# Patient Record
Sex: FEMALE | Race: WHITE | NOT HISPANIC OR LATINO | Employment: FULL TIME | URBAN - METROPOLITAN AREA
[De-identification: names, ages, dates, MRNs, and addresses within clinical notes are randomized per-mention and may not be internally consistent; named-entity substitution may affect disease eponyms.]

---

## 2018-04-04 ENCOUNTER — TRANSCRIBE ORDERS (OUTPATIENT)
Dept: ADMINISTRATIVE | Facility: HOSPITAL | Age: 43
End: 2018-04-04

## 2018-04-04 DIAGNOSIS — R79.89 ELEVATED PROLACTIN LEVEL: Primary | ICD-10-CM

## 2018-04-18 ENCOUNTER — HOSPITAL ENCOUNTER (OUTPATIENT)
Dept: MRI IMAGING | Facility: HOSPITAL | Age: 43
Discharge: HOME | End: 2018-04-18
Admitting: FAMILY MEDICINE

## 2018-04-18 DIAGNOSIS — R79.89 ELEVATED PROLACTIN LEVEL: ICD-10-CM

## 2018-04-18 PROCEDURE — A9577 INJ MULTIHANCE: HCPCS | Performed by: FAMILY MEDICINE

## 2018-04-18 PROCEDURE — 0 GADOBENATE DIMEGLUMINE 529 MG/ML SOLUTION: Performed by: FAMILY MEDICINE

## 2018-04-18 PROCEDURE — 70553 MRI BRAIN STEM W/O & W/DYE: CPT

## 2018-04-18 RX ADMIN — GADOBENATE DIMEGLUMINE 17 ML: 529 INJECTION, SOLUTION INTRAVENOUS at 18:29

## 2018-05-16 ENCOUNTER — APPOINTMENT (OUTPATIENT)
Dept: WOMENS IMAGING | Facility: HOSPITAL | Age: 43
End: 2018-05-16

## 2018-05-16 PROCEDURE — 77067 SCR MAMMO BI INCL CAD: CPT | Performed by: RADIOLOGY

## 2018-05-16 PROCEDURE — 77063 BREAST TOMOSYNTHESIS BI: CPT | Performed by: RADIOLOGY

## 2018-05-17 ENCOUNTER — APPOINTMENT (OUTPATIENT)
Dept: WOMENS IMAGING | Facility: HOSPITAL | Age: 43
End: 2018-05-17

## 2018-05-31 RX ORDER — DULOXETIN HYDROCHLORIDE 60 MG/1
60 CAPSULE, DELAYED RELEASE ORAL DAILY
COMMUNITY
End: 2018-06-04 | Stop reason: ALTCHOICE

## 2018-05-31 RX ORDER — BUSPIRONE HYDROCHLORIDE 10 MG/1
10 TABLET ORAL DAILY
COMMUNITY

## 2018-06-04 ENCOUNTER — OFFICE VISIT (OUTPATIENT)
Dept: NEUROSURGERY | Facility: CLINIC | Age: 43
End: 2018-06-04

## 2018-06-04 ENCOUNTER — TELEPHONE (OUTPATIENT)
Dept: NEUROSURGERY | Facility: CLINIC | Age: 43
End: 2018-06-04

## 2018-06-04 VITALS
RESPIRATION RATE: 18 BRPM | HEIGHT: 61 IN | WEIGHT: 187 LBS | BODY MASS INDEX: 35.3 KG/M2 | DIASTOLIC BLOOD PRESSURE: 62 MMHG | SYSTOLIC BLOOD PRESSURE: 110 MMHG | HEART RATE: 90 BPM

## 2018-06-04 DIAGNOSIS — H53.8 BLURRED VISION, BILATERAL: ICD-10-CM

## 2018-06-04 DIAGNOSIS — E23.7 PITUITARY LESION (HCC): Primary | ICD-10-CM

## 2018-06-04 DIAGNOSIS — H57.02 UNEQUAL PUPILS: ICD-10-CM

## 2018-06-04 DIAGNOSIS — N64.3 GALACTORRHEA: ICD-10-CM

## 2018-06-04 PROCEDURE — 99244 OFF/OP CNSLTJ NEW/EST MOD 40: CPT | Performed by: NURSE PRACTITIONER

## 2018-06-04 RX ORDER — CITALOPRAM 40 MG/1
40 TABLET ORAL DAILY
COMMUNITY

## 2018-06-04 NOTE — TELEPHONE ENCOUNTER
----- Message from ODILON Whatley sent at 6/4/2018  9:38 AM EDT -----  Regarding: Optometry  Please call and request visual field test results for patient from [facility].  This would've been in December 2017.

## 2018-06-04 NOTE — PROGRESS NOTES
Subjective   Patient ID: Lexii Ornelas is a 42 y.o. female is being seen for consultation today at the request of Gila Lee for pituitary tumor. Patient presents unaccompanied.     History of Present Illness  Patient presents for evaluation and treatment recommendations of sellar mass. She reports galactorrhea bilaterally that did not cease after cessation of breast feeding 2+ years ago. It is not spontaneous but expressible. No blood or breast pain present. Recent mammogram normal. Normal menstrual cycles. She has noticed progressive worsening of distance vision over last 1 year but particularly worse in the last 6 months. It is in the context of blurred vision. No family history of brain tumors. She did have 2 miscarriages prior to her first preganancy- no fertility agents. No chronic headaches. No significant changes to shoe or ring size.     The following portions of the patient's history were reviewed and updated as appropriate: allergies, current medications, past family history, past medical history, past social history, past surgical history and problem list.    Review of Systems   Constitutional: Negative for chills, fatigue and fever.   HENT: Negative for hearing loss and tinnitus.    Eyes: Positive for visual disturbance.   Respiratory: Positive for shortness of breath (due to respiratory infection). Negative for cough and wheezing.    Cardiovascular: Negative for chest pain and palpitations.   Gastrointestinal: Negative for abdominal pain, nausea and vomiting.   Endocrine:        Galactorrhea   Genitourinary: Negative for difficulty urinating and enuresis.   Musculoskeletal: Negative for gait problem.   Skin: Negative for rash and wound.   Neurological: Negative for dizziness, seizures, syncope, speech difficulty, weakness, light-headedness, numbness and headaches.   Psychiatric/Behavioral: Negative for confusion, decreased concentration and sleep disturbance.       Objective   Physical  Exam   Constitutional: She is oriented to person, place, and time. She appears well-developed and well-nourished.   Body mass index is 35.33 kg/m².     Eyes: EOM are normal. Pupils are unequal.   Neck: Neck supple. Normal carotid pulses present.   Cardiovascular: Normal rate, regular rhythm and intact distal pulses.    Pulmonary/Chest: Effort normal.   Neurological: She is alert and oriented to person, place, and time. She has normal strength. She has a normal Finger-Nose-Finger Test, a normal Romberg Test and a normal Tandem Gait Test. Gait normal.   Skin: Skin is warm and dry.   Psychiatric: She has a normal mood and affect. Her speech is normal and behavior is normal. Judgment normal.   Vitals reviewed.    Neurologic Exam     Mental Status   Oriented to person, place, and time.   Follows 3 step commands.   Attention: normal. Concentration: normal.   Speech: speech is normal   Level of consciousness: alert  Knowledge: good.   Normal comprehension.     Cranial Nerves     CN II   Visual fields full to confrontation.     CN III, IV, VI   Extraocular motions are normal.   Pupils: unequal  Right pupil: Size: 5 mm.   Left pupil: Size: 3 mm.   CN III: no CN III palsy  CN VI: no CN VI palsy  Nystagmus: none   Diplopia: none  Ophthalmoparesis: none  Conjugate gaze: present    CN V   Facial sensation intact.     CN VII   Facial expression full, symmetric.     CN VIII   CN VIII normal.     CN IX, X   CN IX normal.   CN X normal.     CN XI   CN XI normal.     CN XII   CN XII normal.     Motor Exam   Right arm pronator drift: absent  Left arm pronator drift: absent    Strength   Strength 5/5 throughout.     Sensory Exam   Light touch normal.     Gait, Coordination, and Reflexes     Gait  Gait: normal    Coordination   Romberg: negative  Finger to nose coordination: normal  Tandem walking coordination: normal      Assessment/Plan   Independent Review of Radiographic Studies:    MRI of the brain from [facility]  dated April 18, 2018 was reviewed with Dr. Loo.  This reveals an enhancing mass along the left posterior clinoid up to but not compressive of the optic chiasm.  The pituitary gland itself appears normal.    Medical Decision Making:    Patient presents with complaints of persistent galactorrhea and blurring of vision.  She did breast-feed, but stopped breast-feeding approximately 2 years ago and she continues to have expressible milk.  Her blurred vision has become a progressive issue in the last 6 months to 1 year.  She required no fertility agents for pregnancy but did have 2 miscarriages.  She has normal menstrual cycles.    MRI of the brain showed a sellar abnormality but not compressive of the optic chiasm.  Her exam is as noted above with no neurologic red flags.  Particularly her visual fields appear intact to confrontation.  She does have some pupillary inequality with right greater than left.  No sudden severe headaches.     We will plan to evaluate people inequality with MRA head now.  I will call her with results.  I will order MRI pituitary protocol study in 6 months to evaluate for changes.  We'll order pituitary panel of labs and referral to endocrinology to evaluate for a secretory tumor.  I will request her eye exam visits from [facility].  If she has not had visual field testing, we will order that as well.  I will contact her if that is needed.  Otherwise, she will get her labs next week or so and we will see her back in 6 months.  She knows to contact us if she has any progression of headaches.    Plan: Endocrine labs.  Endocrinology referral.  Request eye records.  MRA head now.  MRI pituitary in 6 months.    Lexii was seen today for brain tumor.    Diagnoses and all orders for this visit:    Pituitary lesion  -     Follicle stimulating hormone; Future  -     Luteinizing hormone; Future  -     ACTH; Future  -     Cortisol; Future  -     Prolactin; Future  -     Insulin-like growth  factor; Future  -     Growth hormone; Future  -     Thyroid Panel With TSH; Future  -     MRI Pituitary With & Without Contrast; Future  -     Ambulatory Referral to Endocrinology    Galactorrhea  -     Follicle stimulating hormone; Future  -     Luteinizing hormone; Future  -     ACTH; Future  -     Cortisol; Future  -     Prolactin; Future  -     Insulin-like growth factor; Future  -     Growth hormone; Future  -     Thyroid Panel With TSH; Future  -     MRI Pituitary With & Without Contrast; Future  -     Ambulatory Referral to Endocrinology    Blurred vision, bilateral  -     MRI Angiogram Head Without Contrast    Unequal pupils  -     MRI Angiogram Head Without Contrast      Return in about 6 months (around 12/4/2018) for Call patient with plan, Follow-up with Dr. Loo, After testing, with imaging.

## 2018-06-04 NOTE — TELEPHONE ENCOUNTER
[facility]: [phone] opens at 10:00am    Spoke with Ariana at [facility], states that patient was only seen August 2017 and no VFT was performed.     Attempted to call patient to confirm patient was seen at [facility] location in [place] and if she was seen after her appt in August at that office or any other office.  LVM for her to call office back.

## 2018-06-05 NOTE — TELEPHONE ENCOUNTER
She needs ophthalmology eval with VFT please. I ordered referral. Dr. Warner unless she has someone in particular that she wants to see.

## 2018-06-05 NOTE — TELEPHONE ENCOUNTER
Patient called back, states that if VFT was not done at visit with Dr. Chávez in 2017, she did not have them done.

## 2018-06-14 ENCOUNTER — TELEPHONE (OUTPATIENT)
Dept: NEUROSURGERY | Facility: CLINIC | Age: 43
End: 2018-06-14

## 2018-06-14 NOTE — TELEPHONE ENCOUNTER
----- Message from Franny Moses sent at 6/14/2018  1:46 PM EDT -----  Regarding: RE: Endo referral  Pt just called and said that she is getting her labs done tomorrow and I gave her scheduling number and she will call and get her MRA scheduled   She is suppose to let us know when it is scheduled  ----- Message -----  From: Barbara A Sturgeon, MA  Sent: 6/14/2018  10:26 AM  To: Franny Moses  Subject: Endo referral                                    Do you know where we are on this referral to endo?  She has outstanding endo labs that are showing up on my incomplete task list.

## 2018-06-15 ENCOUNTER — HOSPITAL ENCOUNTER (OUTPATIENT)
Dept: MRI IMAGING | Facility: HOSPITAL | Age: 43
Discharge: HOME | End: 2018-06-15
Admitting: NURSE PRACTITIONER

## 2018-06-15 ENCOUNTER — LAB (OUTPATIENT)
Dept: LAB | Facility: HOSPITAL | Age: 43
End: 2018-06-15

## 2018-06-15 DIAGNOSIS — N64.3 GALACTORRHEA: ICD-10-CM

## 2018-06-15 DIAGNOSIS — E23.7 PITUITARY LESION (HCC): ICD-10-CM

## 2018-06-15 LAB
CORTIS SERPL-MCNC: 6.37 MCG/DL
FSH SERPL-ACNC: 1.68 MIU/ML
LH SERPL-ACNC: 2.35 MIU/ML
PROLACTIN SERPL-MCNC: 15.7 NG/ML (ref 4.79–23.3)
T-UPTAKE NFR SERPL: 1.11 TBI (ref 0.8–1.3)
T4 SERPL-MCNC: 6.55 MCG/DL (ref 4.5–11.7)
TSH SERPL DL<=0.05 MIU/L-ACNC: 1.91 MIU/ML (ref 0.27–4.2)

## 2018-06-15 PROCEDURE — 83002 ASSAY OF GONADOTROPIN (LH): CPT

## 2018-06-15 PROCEDURE — 84436 ASSAY OF TOTAL THYROXINE: CPT

## 2018-06-15 PROCEDURE — 84146 ASSAY OF PROLACTIN: CPT

## 2018-06-15 PROCEDURE — 83001 ASSAY OF GONADOTROPIN (FSH): CPT

## 2018-06-15 PROCEDURE — 82533 TOTAL CORTISOL: CPT

## 2018-06-15 PROCEDURE — 83003 ASSAY GROWTH HORMONE (HGH): CPT

## 2018-06-15 PROCEDURE — 84443 ASSAY THYROID STIM HORMONE: CPT

## 2018-06-15 PROCEDURE — 84305 ASSAY OF SOMATOMEDIN: CPT

## 2018-06-15 PROCEDURE — 36415 COLL VENOUS BLD VENIPUNCTURE: CPT

## 2018-06-15 PROCEDURE — 82024 ASSAY OF ACTH: CPT

## 2018-06-15 PROCEDURE — 70544 MR ANGIOGRAPHY HEAD W/O DYE: CPT

## 2018-06-15 PROCEDURE — 84479 ASSAY OF THYROID (T3 OR T4): CPT

## 2018-06-18 LAB
ACTH PLAS-MCNC: 11.8 PG/ML (ref 7.2–63.3)
GH SERPL-MCNC: 0.1 NG/ML (ref 0–10)
IGF-I SERPL-MCNC: 151 NG/ML (ref 62–204)

## 2018-06-22 ENCOUNTER — TELEPHONE (OUTPATIENT)
Dept: NEUROSURGERY | Facility: CLINIC | Age: 43
End: 2018-06-22

## 2018-06-22 DIAGNOSIS — H57.02 UNEQUAL PUPILS: Primary | ICD-10-CM

## 2018-06-22 DIAGNOSIS — R90.89 ABNORMAL BRAIN MRI: ICD-10-CM

## 2018-06-22 NOTE — TELEPHONE ENCOUNTER
----- Message from ODILON Whatley sent at 6/4/2018 10:11 AM EDT -----  Regarding: MRA head and VFT results  Unequal pupils; VFT results from [facility]

## 2018-06-22 NOTE — TELEPHONE ENCOUNTER
Please notify patient that I reviewed MRA head with Dr. Loo.  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  This is likely a variation of normal vasculature, but we will repeat it when we repeat her MRI in approximately 6 months.  If there is no change at that time, we will not need to do anything further.  It is certainly not the cause of her unequal pupils.  She should keep the appointment with ophthalmology in early August for evaluation of visual fields as well.

## 2018-06-27 NOTE — TELEPHONE ENCOUNTER
LVM #2 asking patient to call us. If I am not available, please provide her with the information below.

## 2018-08-10 ENCOUNTER — OFFICE VISIT (OUTPATIENT)
Dept: ENDOCRINOLOGY | Age: 43
End: 2018-08-10

## 2018-08-10 VITALS
OXYGEN SATURATION: 97 % | DIASTOLIC BLOOD PRESSURE: 62 MMHG | HEIGHT: 61 IN | BODY MASS INDEX: 35.12 KG/M2 | HEART RATE: 90 BPM | WEIGHT: 186 LBS | SYSTOLIC BLOOD PRESSURE: 116 MMHG

## 2018-08-10 DIAGNOSIS — E23.7 PITUITARY LESION (HCC): Primary | ICD-10-CM

## 2018-08-10 DIAGNOSIS — N64.3 GALACTORRHEA: ICD-10-CM

## 2018-08-10 PROCEDURE — 99204 OFFICE O/P NEW MOD 45 MIN: CPT | Performed by: INTERNAL MEDICINE

## 2018-08-10 NOTE — PROGRESS NOTES
Chief Complaint   Patient presents with   • Pituitary Problem   NEW PATIENT APPOINTMENT/ PITUITARY LESION/ GALACTORRHEA     HPI  Lexii Ornelas,42 y.o. WF is here as new pt for the evaluation of pituitary microadenoma. Consulted by Dr. Loo.   Pt mentioned to her primary care that she has been having breast discharge for the last 2 1/2 years. She has her son in March 2016 - stopped breast  Feeding in July 2016.   She just reports that milk is staining her bra on both sides. No breast tenderness, no enlargement of breasts, no family hx of breast cancer, no double vision, no temporal loss of vision, no frequent head ache.   She even saw Dr. Calixto who saw her Pituitary MRI   She even saw ophthalmologist who didn't feel that her pupil size changes were related to the tumor. Has another appointment with her opthal in 1 month.   Has been on buspirone and celexa for about 20 years for depression and no recent change in meds.  No recreational drug usage.     Menarche at age 12, she struggled with getting pregnant, had 2 miscarriages. She has 2 kids of her own, youngest is 2 years old.   She didn't need any fertility treatment to get pregnant.   No other known hormonal problems     Reviewed primary care physician's/consulting physician documentation and lab results :     I have reviewed the patient's allergies, medicines, past medical hx, family hx and social hx in detail.    Past Medical History:   Diagnosis Date   • Abnormal Pap smear of cervix     LEEP    • Anxiety    • Asthma    • Basal cell carcinoma     basal cell carcinoma   • Broken bones    • Depression    • Migraine        Family History   Problem Relation Age of Onset   • No Known Problems Mother    • No Known Problems Father    • Stroke Maternal Grandmother        Social History     Social History   • Marital status:      Spouse name: N/A   • Number of children: N/A   • Years of education: N/A     Occupational History   • management      full time  "    Social History Main Topics   • Smoking status: Never Smoker   • Smokeless tobacco: Never Used   • Alcohol use Yes      Comment: social drinking   • Drug use: No   • Sexual activity: Yes     Partners: Male     Other Topics Concern   • Not on file     Social History Narrative   • No narrative on file       Allergies   Allergen Reactions   • Benadryl [Diphenhydramine] Other (See Comments)     Wakefullness   • Silicone Rash         Current Outpatient Prescriptions:   •  busPIRone (BUSPAR) 10 MG tablet, Take 10 mg by mouth Daily., Disp: , Rfl:   •  citalopram (CeleXA) 40 MG tablet, Take 40 mg by mouth Daily., Disp: , Rfl:   •  Multiple Vitamins-Minerals (CENTRUM ADULTS PO), Take  by mouth Daily., Disp: , Rfl:      Review of Systems   Constitutional: Negative for appetite change, fatigue and fever.   Eyes: Positive for visual disturbance.   Respiratory: Negative for shortness of breath.    Cardiovascular: Negative for palpitations and leg swelling.   Gastrointestinal: Negative for abdominal pain and vomiting.   Endocrine: Negative for polydipsia and polyuria.   Musculoskeletal: Negative for joint swelling and neck pain.   Skin: Negative for rash.   Neurological: Negative for weakness and numbness.   Psychiatric/Behavioral: Negative for behavioral problems.       Objective:    /62   Pulse 90   Ht 154.9 cm (61\")   Wt 84.4 kg (186 lb)   SpO2 97%   BMI 35.14 kg/m²     Physical Exam   Constitutional: She is oriented to person, place, and time. She appears well-nourished.   HENT:   Head: Normocephalic and atraumatic.   Eyes: Conjunctivae and EOM are normal. No scleral icterus.   anisocoria   Neck: Normal range of motion. Neck supple. No thyromegaly present.   Cardiovascular: Normal rate and normal heart sounds.  Exam reveals no friction rub.    No murmur heard.  Pulmonary/Chest: Effort normal and breath sounds normal. No stridor. She has no wheezes. She has no rales.   Abdominal: Soft. Bowel sounds are normal. " She exhibits no distension. There is no tenderness.   Musculoskeletal: She exhibits no edema or tenderness.   Lymphadenopathy:     She has no cervical adenopathy.   Neurological: She is alert and oriented to person, place, and time.   Skin: Skin is warm and dry. She is not diaphoretic.   Psychiatric: She has a normal mood and affect.   Vitals reviewed.      Results Review:    I reviewed the patient's new clinical results.    Lab on 06/15/2018   Component Date Value Ref Range Status   • FSH 06/15/2018 1.68  mIU/mL Final   • LH 06/15/2018 2.35  mIU/mL Final   • ACTH 06/15/2018 11.8  7.2 - 63.3 pg/mL Final    ACTH reference interval for samples collected between 7 and 10 AM.   • Cortisol 06/15/2018 6.37    mcg/dL Final   • Prolactin 06/15/2018 15.70  4.79 - 23.30 ng/mL Final   • Insulin-Like Growth Factor-1 06/15/2018 151  62 - 204 ng/mL Final   • Growth Hormone 06/15/2018 0.1  0.0 - 10.0 ng/mL Final   • TSH 06/15/2018 1.910  0.270 - 4.200 mIU/mL Final   • T Uptake 06/15/2018 1.11  0.80 - 1.30 TBI Final   • T4, Total 06/15/2018 6.55  4.50 - 11.70 mcg/dL Final       Lexii was seen today for pituitary problem.    Diagnoses and all orders for this visit:    Pituitary lesion (CMS/HCC)  -     ACTH  -     Comprehensive Metabolic Panel  -     Cortisol - AM  -     Macroprolactin  -     Prolactin  -     T4, Free  -     TSH  -     Basic Metabolic Panel; Future  -     Hemoglobin A1c; Future  -     Lipid Panel; Future  -     TSH; Future  -     Vitamin B12 & Folate; Future  -     Vitamin D 25 Hydroxy; Future  -     Prolactin; Future  -     Macroprolactin; Future    Galactorrhea  -     ACTH  -     Comprehensive Metabolic Panel  -     Cortisol - AM  -     Macroprolactin  -     Prolactin  -     T4, Free  -     TSH  -     Basic Metabolic Panel; Future  -     Hemoglobin A1c; Future  -     Lipid Panel; Future  -     TSH; Future  -     Vitamin B12 & Folate; Future  -     Vitamin D 25 Hydroxy; Future  -     Prolactin; Future  -      "Macroprolactin; Future    Pituitary microadenoma  Patient is scheduled for a repeat MRI of the pituitary in October 2018.  Discussed with the patient the warning signs of headache, temporal loss of vision, at which time that she needs to go to the emergency department.    Galactorrhea - worse  Noted that patient's prolactin levels are within normal limits.  Will check macro prolactin level, if the level was abnormal with start the patient on cabergoline.  However if that macro prolactin is within normal limits discussed with the patient to refrain from breasts stimulation and to see if her breast discharge would remit on its own.  Noted that to the rest of the HPA axis is within normal limits.    Also discussed with the patient about stopping her citalopram and buspirone as they could be resulting in the galactorrhea as well but patient's depression is so worse that she is concerned that she could get suicidal if she stops the medication or changes herself to any atypical antipsychotics as she has done that in the past with no success.      Thank you for asking me to see your patient Lexii Ornelas in consultation.        Yoli Martin MD  08/10/18    EMR Dragon / transcription disclaimer:     \"Dictated utilizing Dragon dictation\".         "

## 2018-08-17 LAB
ACTH PLAS-MCNC: 11.3 PG/ML (ref 7.2–63.3)
ALBUMIN SERPL-MCNC: 4.2 G/DL (ref 3.5–5.2)
ALBUMIN/GLOB SERPL: 1.3 G/DL
ALP SERPL-CCNC: 48 U/L (ref 39–117)
ALT SERPL-CCNC: 12 U/L (ref 1–33)
AST SERPL-CCNC: 11 U/L (ref 1–32)
BILIRUB SERPL-MCNC: 0.2 MG/DL (ref 0.1–1.2)
BUN SERPL-MCNC: 10 MG/DL (ref 6–20)
BUN/CREAT SERPL: 13.2 (ref 7–25)
CALCIUM SERPL-MCNC: 9.3 MG/DL (ref 8.6–10.5)
CHLORIDE SERPL-SCNC: 104 MMOL/L (ref 98–107)
CO2 SERPL-SCNC: 23.1 MMOL/L (ref 22–29)
CORTIS AM PEAK SERPL-MCNC: 5.2 UG/DL (ref 6.2–19.4)
CREAT SERPL-MCNC: 0.76 MG/DL (ref 0.57–1)
GLOBULIN SER CALC-MCNC: 3.2 GM/DL
GLUCOSE SERPL-MCNC: 95 MG/DL (ref 65–99)
MACROPROLACTIN/PROLACTIN SFR SERPL: 0 %
POTASSIUM SERPL-SCNC: 4.4 MMOL/L (ref 3.5–5.2)
PROLACTIN SERPL-MCNC: 12 NG/ML
PROLACTIN SERPL-MCNC: 13.2 NG/ML (ref 4.8–23.3)
PROLACTIN.MONOMERIC SERPL-MCNC: 12 NG/ML
PROT SERPL-MCNC: 7.4 G/DL (ref 6–8.5)
SODIUM SERPL-SCNC: 138 MMOL/L (ref 136–145)
T4 FREE SERPL-MCNC: 1.13 NG/DL (ref 0.93–1.7)
TSH SERPL DL<=0.005 MIU/L-ACNC: 1.7 MIU/ML (ref 0.27–4.2)

## 2018-09-28 ENCOUNTER — TELEPHONE (OUTPATIENT)
Dept: NEUROSURGERY | Facility: CLINIC | Age: 43
End: 2018-09-28

## 2018-10-02 NOTE — TELEPHONE ENCOUNTER
Patient called and would like to know if she should move her appt with Dr Loo and her MRI out. She had scheduled her MRI for Oct because she had her last one done in April and Oct would have been 6 months.    We sent the patient to see Dr Warner and she was not able to help her and has sent her to a doctor at [facility]. Pt sees that physician on 10/24. Should she wait til after she sees that MD to see Rosario      Please advise

## 2018-10-03 NOTE — TELEPHONE ENCOUNTER
Yes, need to move appt to after results from neuro-ophth available. Moved to [facility] Friday 11-9 at 9:15. Left detailed VM with this information and mailed letter to patient with this information.

## 2018-10-05 ENCOUNTER — APPOINTMENT (OUTPATIENT)
Dept: MRI IMAGING | Facility: HOSPITAL | Age: 43
End: 2018-10-05

## 2018-11-01 ENCOUNTER — HOSPITAL ENCOUNTER (OUTPATIENT)
Dept: MRI IMAGING | Facility: HOSPITAL | Age: 43
Discharge: HOME | End: 2018-11-01
Admitting: NURSE PRACTITIONER

## 2018-11-01 ENCOUNTER — HOSPITAL ENCOUNTER (OUTPATIENT)
Dept: MRI IMAGING | Facility: HOSPITAL | Age: 43
Discharge: HOME | End: 2018-11-01

## 2018-11-01 DIAGNOSIS — R90.89 ABNORMAL BRAIN MRI: ICD-10-CM

## 2018-11-01 DIAGNOSIS — E23.7 PITUITARY LESION (HCC): ICD-10-CM

## 2018-11-01 DIAGNOSIS — H57.02 UNEQUAL PUPILS: ICD-10-CM

## 2018-11-01 DIAGNOSIS — N64.3 GALACTORRHEA: ICD-10-CM

## 2018-11-01 PROCEDURE — 0 GADOBENATE DIMEGLUMINE 529 MG/ML SOLUTION: Performed by: NURSE PRACTITIONER

## 2018-11-01 PROCEDURE — 70544 MR ANGIOGRAPHY HEAD W/O DYE: CPT

## 2018-11-01 PROCEDURE — 70553 MRI BRAIN STEM W/O & W/DYE: CPT

## 2018-11-01 PROCEDURE — A9577 INJ MULTIHANCE: HCPCS | Performed by: NURSE PRACTITIONER

## 2018-11-01 RX ADMIN — GADOBENATE DIMEGLUMINE 17 ML: 529 INJECTION, SOLUTION INTRAVENOUS at 18:48

## 2018-11-09 ENCOUNTER — OFFICE VISIT (OUTPATIENT)
Dept: NEUROSURGERY | Facility: CLINIC | Age: 43
End: 2018-11-09

## 2018-11-09 VITALS
RESPIRATION RATE: 16 BRPM | WEIGHT: 190 LBS | SYSTOLIC BLOOD PRESSURE: 102 MMHG | DIASTOLIC BLOOD PRESSURE: 62 MMHG | BODY MASS INDEX: 35.9 KG/M2 | HEART RATE: 80 BPM

## 2018-11-09 DIAGNOSIS — H57.02 UNEQUAL PUPILS: ICD-10-CM

## 2018-11-09 DIAGNOSIS — R90.89 ABNORMAL BRAIN MRI: ICD-10-CM

## 2018-11-09 DIAGNOSIS — N64.3 GALACTORRHEA: Primary | ICD-10-CM

## 2018-11-09 DIAGNOSIS — R90.89 ABNORMAL MRA, BRAIN: ICD-10-CM

## 2018-11-09 PROCEDURE — 99214 OFFICE O/P EST MOD 30 MIN: CPT | Performed by: NEUROLOGICAL SURGERY

## 2018-11-09 NOTE — PROGRESS NOTES
Subjective   Patient ID: Lexii Ornelas is a 42 y.o. female is here today for consideration of para- sellar lesion and question of cerebral aneurysms. She is referred by ODILON Escobar. She has undergone repeat MRI scanning as well as full ophthalmology evaluation. She is unaccompanied.    History of Present Illness     Patient presents for ongoing follow-up of sellar mass.  At the last office visit with the APRN, she was noted to have pupillary inequality but no optic chiasm compression from the sellar lesion.  Since her last office visit she has had MRA of the head ×2, endocrinology evaluation, ophthalmology evaluation with Dr. Warner as well as neuro-ophthalmology, Dr. Bowden.  No treatment needed per Dr. Bowden's note dated October 24, 2018.  She continues to have a small amount of galactorrhea intermittently. She is on Buspar and Celexa. Dr. Martin asked her to stop meds, but she does not feel it is not an option for her mentally to discontinue this medication.     The following portions of the patient's history were reviewed and updated as appropriate: allergies, current medications and problem list.    Review of Systems   Eyes: Negative for visual disturbance.        Has stye on eye   Endocrine:        Denies changes in weight, ring/shoe size, or facial features   Musculoskeletal: Negative for gait problem.   Neurological: Negative for dizziness, speech difficulty and headaches.   Psychiatric/Behavioral: Negative for decreased concentration.       Objective    Labs from Nisha 15, 2018 as well as August, 2018 were reviewed.  All were normal except for slight diminishment in cortisol level in August.  Normal prolactin, growth hormone, IGF 1.    Physical Exam   Constitutional: She is oriented to person, place, and time. She appears well-developed and well-nourished.   Body mass index is 35.33 kg/m².     Eyes: EOM are normal. Right eye exhibits no chemosis. Left eye exhibits no chemosis. Pupils are unequal.        Neck: Neck supple.   Pulmonary/Chest: Effort normal.   Neurological: She is alert and oriented to person, place, and time. She has a normal Finger-Nose-Finger Test. Gait normal.   Skin: Skin is warm and dry.   Psychiatric: She has a normal mood and affect. Her speech is normal and behavior is normal. Judgment normal.   Vitals reviewed.    Neurologic Exam     Mental Status   Oriented to person, place, and time.   Follows 3 step commands.   Attention: normal. Concentration: normal.   Speech: speech is normal   Level of consciousness: alert  Knowledge: good.   Normal comprehension.     Cranial Nerves     CN II   Visual fields full to confrontation.     CN III, IV, VI   Extraocular motions are normal.   Pupils: unequal  Right pupil: Size: 5 mm. Shape: regular. Reactivity: brisk.   Left pupil: Size: 3 mm. Shape: regular. Reactivity: brisk.   CN III: no CN III palsy  CN VI: no CN VI palsy  Nystagmus: none   Diplopia: none  Ophthalmoparesis: none  Conjugate gaze: present    CN V   Facial sensation intact.     CN VII   Facial expression full, symmetric.     CN VIII   CN VIII normal.     CN IX, X   CN IX normal.   CN X normal.     CN XI   CN XI normal.     CN XII   CN XII normal.     Motor Exam   Right arm pronator drift: absent  Left arm pronator drift: absent    Gait, Coordination, and Reflexes     Gait  Gait: normal    Coordination   Finger to nose coordination: normal      Assessment/Plan   Independent Review of Radiographic Studies:    I personally reviewed MRIs done regard to the pituitary as well as MRAs done recently: I compared them to prior studies.  The MRI demonstrates a stable 2.5 mm enhancing lesion along the anterior clinoid abutting but not compressing the left optic nerve.  This is totally unchanged.  The MRA is also totally unchanged and shows some very tiny protuberances on the posterior aspect of the carotid arteries bilaterally are most likely consistent with infundibulae but could represent tiny -  itty bitty aneurysms.  Medical Decision Making:    I confirmed and obtained the above history as recorded by the nurse practitioner acting as a scribe. I performed the above examination and it is documented by the nurse practitioner acting as a scribe.    She presents with the above-noted essentially asymptomatic MRI findings.  She is undergone an extensive ophthalmologic evaluation and an endocrinologic evaluation.    From the neurosurgery standpoint there is absolutely nothing that I operated on right now.  I do think it appropriate to consider follow-up for these 2 lesions to make sure that the small enhancing lesion on the left anterior clinoid does not change much in size or configuration.  If it were to grow much consideration of resection would be appropriate.  Also I think a follow-up MRA in about 5 years would be appropriate considering the size of these abnormalities.  I explained to the patient that if there were cerebral aneurysms present of this size the risk of over 5 years of rupture is calculated as close to 0.  Certainly any formal treatment carries a much higher risk to her some kind of problem so we will absolutely leave these alone.      Lexii was seen today for pituitary adenoma.    Diagnoses and all orders for this visit:    Galactorrhea    Unequal pupils    Left parasellar enhancing lesion   -     MRI Pituitary With & Without Contrast; Future    Tiny protuberances from the posterior wall of the internal carotid arteries bilaterallly  -     MRI Angiogram Head With Contrast; Future      Return in about 2 years (around 11/9/2020) for Follow up with nurse practitioner.

## 2018-11-10 ENCOUNTER — RESULTS ENCOUNTER (OUTPATIENT)
Dept: ENDOCRINOLOGY | Age: 43
End: 2018-11-10

## 2018-11-10 DIAGNOSIS — N64.3 GALACTORRHEA: ICD-10-CM

## 2018-11-10 DIAGNOSIS — E23.7 PITUITARY LESION (HCC): ICD-10-CM

## 2019-01-17 ENCOUNTER — OFFICE VISIT (OUTPATIENT)
Dept: ENDOCRINOLOGY | Age: 44
End: 2019-01-17

## 2019-01-17 VITALS
SYSTOLIC BLOOD PRESSURE: 106 MMHG | BODY MASS INDEX: 36.06 KG/M2 | OXYGEN SATURATION: 99 % | WEIGHT: 191 LBS | HEIGHT: 61 IN | HEART RATE: 97 BPM | DIASTOLIC BLOOD PRESSURE: 70 MMHG

## 2019-01-17 DIAGNOSIS — E23.7 PITUITARY LESION (HCC): Primary | ICD-10-CM

## 2019-01-17 DIAGNOSIS — N64.3 GALACTORRHEA: ICD-10-CM

## 2019-01-17 PROCEDURE — 99214 OFFICE O/P EST MOD 30 MIN: CPT | Performed by: INTERNAL MEDICINE

## 2019-01-17 RX ORDER — CABERGOLINE 0.5 MG/1
0.25 TABLET ORAL WEEKLY
Qty: 6 TABLET | Refills: 3 | Status: SHIPPED | OUTPATIENT
Start: 2019-01-17 | End: 2020-01-17

## 2019-01-17 NOTE — PROGRESS NOTES
43 y.o.    Patient Care Team:  Gila Dillon MD as PCP - General  Gila Dillon MD as PCP - Family Medicine  Aggie Puente MD as Consulting Physician (Obstetrics and Gynecology)  Marija Warner MD as Consulting Physician (Ophthalmology)    Chief Complaint:    FOLLOW UP APPOINTMENT/PITUITARY LESION/ GALACTORRHEA   Subjective     HPI     Lexii Ornelas,43 y.o. WF is here as f/u pt for the evaluation of pituitary microadenoma. Consulted by Dr. Loo.     Patient has been noticing breast discharge in terms of sustaining her bra for the last 2-3 years.  When she approached her primary care with these symptoms for prolactin workup and MRI of the pituitary has been pursued.  Pituitary MRI showed a microadenoma and she is currently being followed by the neurosurgeon who has been planning to see the patient every 2 years.    Patient was also noted to have an abnormality in her pupil size which according to the ophthalmologist as not related to the pituitary tumor.  Patient has also been on Buspar and Celexa for the last 20 years for her depression, no recent changes in the medication.  But she is extremely uncomfortable to either stop these medications or change them to other medications.    Today in the clinic patient still reports some breast discharge, breast tenderness.  No significant headaches, no blurry vision, no temporal loss of vision.  Menarche at age 12, she struggled with getting pregnant, had 2 miscarriages. She has 2 kids of her own, youngest is 2 years old.   She didn't need any fertility treatment to get pregnant.   No other known hormonal problems     Reviewed primary care physician's/consulting physician documentation and lab results :     Interval History      The following portions of the patient's history were reviewed and updated as appropriate: allergies, current medications, past family history, past medical history, past social history, past surgical history and  problem list.    Past Medical History:   Diagnosis Date   • Abnormal Pap smear of cervix     LEEP    • Anxiety    • Asthma    • Basal cell carcinoma     basal cell carcinoma   • Broken bones    • Depression    • Migraine      Family History   Problem Relation Age of Onset   • No Known Problems Mother    • No Known Problems Father    • Stroke Maternal Grandmother      Social History     Socioeconomic History   • Marital status:      Spouse name: Not on file   • Number of children: Not on file   • Years of education: Not on file   • Highest education level: Not on file   Social Needs   • Financial resource strain: Not on file   • Food insecurity - worry: Not on file   • Food insecurity - inability: Not on file   • Transportation needs - medical: Not on file   • Transportation needs - non-medical: Not on file   Occupational History   • Occupation: management     Comment: full time   Tobacco Use   • Smoking status: Never Smoker   • Smokeless tobacco: Never Used   Substance and Sexual Activity   • Alcohol use: Yes     Comment: social drinking   • Drug use: No   • Sexual activity: Yes     Partners: Male   Other Topics Concern   • Not on file   Social History Narrative   • Not on file     Allergies   Allergen Reactions   • Benadryl [Diphenhydramine] Other (See Comments)     [place]akefullness   • Silicone Rash       Current Outpatient Medications:   •  busPIRone (BUSPAR) 10 MG tablet, Take 10 mg by mouth Daily., Disp: , Rfl:   •  citalopram (CeleXA) 40 MG tablet, Take 40 mg by mouth Daily., Disp: , Rfl:   •  cabergoline (DOSTINEX) 0.5 MG tablet, Take 0.5 tablets by mouth 1 (One) Time Per Week., Disp: 6 tablet, Rfl: 3  •  Multiple Vitamins-Minerals (CENTRUM ADULTS PO), Take  by mouth Daily., Disp: , Rfl:         Review of Systems   Constitutional: Negative for appetite change, fatigue and fever.   HENT: Negative for trouble swallowing.    Eyes: Negative for visual disturbance.   Respiratory: Negative for shortness of  "breath.    Cardiovascular: Negative for palpitations and leg swelling.   Gastrointestinal: Negative for abdominal pain and vomiting.   Endocrine: Negative for polydipsia and polyuria.   Musculoskeletal: Negative for joint swelling and neck pain.   Skin: Negative for rash.   Neurological: Negative for weakness and numbness.   Psychiatric/Behavioral: Negative for behavioral problems.       Objective       Vitals:    01/17/19 1245   BP: 106/70   Pulse: 97   SpO2: 99%   Weight: 86.6 kg (191 lb)   Height: 154.9 cm (61\")     Body mass index is 36.09 kg/m².      Physical Exam   Constitutional: She is oriented to person, place, and time. She appears well-nourished.   HENT:   Head: Normocephalic and atraumatic.   Eyes: Conjunctivae and EOM are normal. No scleral icterus.   Neck: Normal range of motion. Neck supple. No thyromegaly present.   Cardiovascular: Normal rate and normal heart sounds. Exam reveals no friction rub.   No murmur heard.  Pulmonary/Chest: Effort normal and breath sounds normal. No stridor. She has no wheezes. She has no rales.   Abdominal: Soft. Bowel sounds are normal. She exhibits no distension. There is no tenderness.   Central obesity   Musculoskeletal: She exhibits no edema or tenderness.   Lymphadenopathy:     She has no cervical adenopathy.   Neurological: She is alert and oriented to person, place, and time.   Skin: Skin is warm and dry. She is not diaphoretic.   Psychiatric: She has a normal mood and affect.   Vitals reviewed.    Results Review:     I reviewed the patient's new clinical results and mentioned them above in HPI and in plan as well.    Medical records reviewed  Summary: done      Office Visit on 08/10/2018   Component Date Value Ref Range Status   • ACTH 08/10/2018 11.3  7.2 - 63.3 pg/mL Final    ACTH reference interval for samples collected between 7 and 10 AM.   • Glucose 08/10/2018 95  65 - 99 mg/dL Final   • BUN 08/10/2018 10  6 - 20 mg/dL Final   • Creatinine 08/10/2018 0.76  " 0.57 - 1.00 mg/dL Final   • eGFR Non  Am 08/10/2018 84  >60 mL/min/1.73 Final   • eGFR African Am 08/10/2018 102  >60 mL/min/1.73 Final   • BUN/Creatinine Ratio 08/10/2018 13.2  7.0 - 25.0 Final   • Sodium 08/10/2018 138  136 - 145 mmol/L Final   • Potassium 08/10/2018 4.4  3.5 - 5.2 mmol/L Final   • Chloride 08/10/2018 104  98 - 107 mmol/L Final   • Total CO2 08/10/2018 23.1  22.0 - 29.0 mmol/L Final   • Calcium 08/10/2018 9.3  8.6 - 10.5 mg/dL Final   • Total Protein 08/10/2018 7.4  6.0 - 8.5 g/dL Final   • Albumin 08/10/2018 4.20  3.50 - 5.20 g/dL Final   • Globulin 08/10/2018 3.2  gm/dL Final   • A/G Ratio 08/10/2018 1.3  g/dL Final   • Total Bilirubin 08/10/2018 0.2  0.1 - 1.2 mg/dL Final   • Alkaline Phosphatase 08/10/2018 48  39 - 117 U/L Final   • AST (SGOT) 08/10/2018 11  1 - 32 U/L Final   • ALT (SGPT) 08/10/2018 12  1 - 33 U/L Final   • Cortisol - AM 08/10/2018 5.2* 6.2 - 19.4 ug/dL Final   • Prolactin 08/10/2018 12  ng/mL Final    Comment: Hook effect or prozone effect has been ruled out by  performing additional dilution analysis on all prolactin  testing.  Reference Range:  Children and Adult       Females: 3 - 24     • Prolactin, Monomeric 08/10/2018 12  ng/mL Final    Comment: Reference Range:  Children and Adults: 3 - 24     • Macroprolactin, % 08/10/2018 0  % Final   • Prolactin 08/10/2018 13.2  4.8 - 23.3 ng/mL Final   • Free T4 08/10/2018 1.13  0.93 - 1.70 ng/dL Final   • TSH 08/10/2018 1.700  0.270 - 4.200 mIU/mL Final     No results found for: HGBA1C  Lab Results   Component Value Date    CREATININE 0.76 08/10/2018     Imaging Results (most recent)     None                Assessment and Plan:    Diagnoses and all orders for this visit:    Pituitary lesion (CMS/HCC)  -     Cortisol, Urine, Free 24Hr - Urine, Clean Catch; Future  -     Prolactin; Future  -     TSH; Future  -     T4, Free; Future  -     Comprehensive Metabolic Panel; Future  -     Lipid Panel; Future  -     Vitamin B12 &  "Folate; Future  -     Vitamin D 25 Hydroxy; Future    Galactorrhea  -     Cortisol, Urine, Free 24Hr - Urine, Clean Catch; Future  -     Prolactin; Future  -     TSH; Future  -     T4, Free; Future  -     Comprehensive Metabolic Panel; Future  -     Lipid Panel; Future  -     Vitamin B12 & Folate; Future  -     Vitamin D 25 Hydroxy; Future    Other orders  -     cabergoline (DOSTINEX) 0.5 MG tablet; Take 0.5 tablets by mouth 1 (One) Time Per Week.        Hyperprolactinemia  Thyroid panel has been noted to be within normal limits  Prolactin levels are abnormal  Will check 24-hour urine cortisol levels as the prior cortisol levels were checked and later part of the day and has been noted to be abnormal.    Galactorrhea  Since prolactin level is mildly elevated Will start the patient on Dostinex 0.25 mg oral once a week  Discussed the benefits and side effects of the medication.    Hyperlipidemia  Discussed with the patient about considering to be started on a statin.    Reviewed Lab results with the patient.             Yoli Martin MD  01/17/19    EMR Dragon / transcription disclaimer:     \"Dictated utilizing Dragon dictation\".  "

## 2019-01-18 LAB
25(OH)D3+25(OH)D2 SERPL-MCNC: 21.6 NG/ML (ref 30–100)
BUN SERPL-MCNC: 12 MG/DL (ref 6–20)
BUN/CREAT SERPL: 16.9 (ref 7–25)
CALCIUM SERPL-MCNC: 9.2 MG/DL (ref 8.6–10.5)
CHLORIDE SERPL-SCNC: 103 MMOL/L (ref 98–107)
CHOLEST SERPL-MCNC: 205 MG/DL (ref 0–200)
CO2 SERPL-SCNC: 24.9 MMOL/L (ref 22–29)
CREAT SERPL-MCNC: 0.71 MG/DL (ref 0.57–1)
FOLATE SERPL-MCNC: 7.78 NG/ML (ref 4.78–24.2)
GLUCOSE SERPL-MCNC: 72 MG/DL (ref 65–99)
HBA1C MFR BLD: 5.49 % (ref 4.8–5.6)
HDLC SERPL-MCNC: 37 MG/DL (ref 40–60)
INTERPRETATION: NORMAL
LDLC SERPL CALC-MCNC: 131 MG/DL (ref 0–100)
Lab: NORMAL
MACROPROLACTIN/PROLACTIN SFR SERPL: 13 %
POTASSIUM SERPL-SCNC: 4.3 MMOL/L (ref 3.5–5.2)
PROLACTIN SERPL-MCNC: 24 NG/ML
PROLACTIN SERPL-MCNC: 24.8 NG/ML (ref 4.8–23.3)
PROLACTIN.MONOMERIC SERPL-MCNC: 21 NG/ML
SODIUM SERPL-SCNC: 141 MMOL/L (ref 136–145)
TRIGL SERPL-MCNC: 186 MG/DL (ref 0–150)
TSH SERPL DL<=0.005 MIU/L-ACNC: 2.29 MIU/ML (ref 0.27–4.2)
VIT B12 SERPL-MCNC: 305 PG/ML (ref 211–946)
VLDLC SERPL CALC-MCNC: 37.2 MG/DL (ref 5–40)

## 2019-02-07 LAB
CORTIS F 24H UR-MRATE: 8 UG/24 HR (ref 0–50)
CORTIS F UR-MCNC: 4 UG/L

## 2019-02-07 NOTE — PROGRESS NOTES
Mail results to pt, no treatment changes at this time.  24-hour urine cortisol levels are within normal limits.  Unlikely to have Cushing's disease or Lock[place]'s disease.

## 2020-01-17 ENCOUNTER — RESULTS ENCOUNTER (OUTPATIENT)
Dept: ENDOCRINOLOGY | Age: 45
End: 2020-01-17

## 2020-01-17 DIAGNOSIS — N64.3 GALACTORRHEA: ICD-10-CM

## 2020-01-17 DIAGNOSIS — E23.7 PITUITARY LESION (HCC): ICD-10-CM

## 2020-05-28 ENCOUNTER — APPOINTMENT (OUTPATIENT)
Dept: WOMENS IMAGING | Facility: HOSPITAL | Age: 45
End: 2020-05-28

## 2020-05-28 PROCEDURE — 77063 BREAST TOMOSYNTHESIS BI: CPT | Performed by: RADIOLOGY

## 2020-05-28 PROCEDURE — 77067 SCR MAMMO BI INCL CAD: CPT | Performed by: RADIOLOGY

## 2020-07-09 NOTE — TELEPHONE ENCOUNTER
Patient is scheduled for 08/10/18 with Dr. Warner at 2:15. Spoke with patient, informed of appt.    normal...

## 2020-07-16 DIAGNOSIS — R90.89 ABNORMAL BRAIN MRI: ICD-10-CM

## 2020-08-03 ENCOUNTER — HOSPITAL ENCOUNTER (OUTPATIENT)
Dept: MRI IMAGING | Facility: HOSPITAL | Age: 45
Discharge: HOME | End: 2020-08-03

## 2020-08-03 DIAGNOSIS — R90.89 ABNORMAL BRAIN MRI: ICD-10-CM

## 2020-08-03 PROCEDURE — A9575 INJ GADOTERATE MEGLUMI 0.1ML: HCPCS | Performed by: NURSE PRACTITIONER

## 2020-08-03 PROCEDURE — 25010000002 GADOTERATE MEGLUMINE 10 MMOL/20ML SOLUTION: Performed by: NURSE PRACTITIONER

## 2020-08-03 PROCEDURE — 70553 MRI BRAIN STEM W/O & W/DYE: CPT

## 2020-08-03 RX ORDER — GADOTERATE MEGLUMINE 376.9 MG/ML
20 INJECTION INTRAVENOUS
Status: COMPLETED | OUTPATIENT
Start: 2020-08-03 | End: 2020-08-03

## 2020-08-03 RX ADMIN — GADOTERATE MEGLUMINE 20 ML: 376.9 INJECTION, SOLUTION INTRAVENOUS at 19:27

## 2020-08-04 ENCOUNTER — TELEPHONE (OUTPATIENT)
Dept: NEUROSURGERY | Facility: CLINIC | Age: 45
End: 2020-08-04

## 2020-08-04 NOTE — TELEPHONE ENCOUNTER
----- Message from ODILON Whatley sent at 8/4/2020  9:47 AM EDT -----  Needs f/u appt with NP or [facility]. Not urgent- imaging stable

## 2020-08-11 ENCOUNTER — TELEPHONE (OUTPATIENT)
Dept: NEUROSURGERY | Facility: CLINIC | Age: 45
End: 2020-08-11

## 2020-12-15 ENCOUNTER — TELEPHONE (OUTPATIENT)
Dept: NEUROSURGERY | Facility: CLINIC | Age: 45
End: 2020-12-15

## 2020-12-15 NOTE — TELEPHONE ENCOUNTER
LM for patient that Dr. Tenorio is being pulled into surgery tomorrow. I changed her appt to see Racheal so she would not have to wait that long. Her appt is December 24 at 10:00 with Racheal Sam in [address].

## 2023-03-01 ENCOUNTER — TELEPHONE (OUTPATIENT)
Dept: FAMILY MEDICINE CLINIC | Facility: CLINIC | Age: 48
End: 2023-03-01
Payer: COMMERCIAL

## 2023-03-01 DIAGNOSIS — U07.1 COVID-19 VIRUS INFECTION: Primary | ICD-10-CM

## 2023-03-01 NOTE — TELEPHONE ENCOUNTER
First I want to verify that her COVID test was positive February 7.  If that is the case, symptoms should have resolved by now and she may need an evaluation somewhere.    Otherwise for typical respiratory symptoms, I recommend Mucinex for thick mucus, salt water gargles and nose spray to help break up mucus, no nasal or Nasacort nasal spray to decrease mucus production, a cough medicine like Delsym and then Tylenol or  ibuprofen for fever and aches.

## 2023-03-01 NOTE — TELEPHONE ENCOUNTER
Caller: Lexii Ornelas    Relationship to patient: Self    Best call back number: [phone]    Date of exposure: UNKOWN    Date of positive COVID19 test: 02/07/23        COVID19 symptoms: FEVER, CHILLS, COUGH, BODY ACHES, NAUSEA.        Additional information or concerns: PATIENT IS ASKING ABOUT GETTING PAXLOVID OR WHAT SHE NEEDS TO BE TAKING FOR SYMPTOMS.    What is the patients [id number] [address] AT [place] [address] & [address] - [phone]  - [phone] FX

## 2023-05-30 ENCOUNTER — OFFICE VISIT (OUTPATIENT)
Dept: FAMILY MEDICINE CLINIC | Facility: CLINIC | Age: 48
End: 2023-05-30

## 2023-05-30 VITALS
HEIGHT: 61 IN | WEIGHT: 202.1 LBS | HEART RATE: 78 BPM | TEMPERATURE: 98.3 F | DIASTOLIC BLOOD PRESSURE: 76 MMHG | OXYGEN SATURATION: 99 % | RESPIRATION RATE: 16 BRPM | SYSTOLIC BLOOD PRESSURE: 118 MMHG | BODY MASS INDEX: 38.16 KG/M2

## 2023-05-30 DIAGNOSIS — F32.5 MAJOR DEPRESSIVE DISORDER IN REMISSION, UNSPECIFIED WHETHER RECURRENT: ICD-10-CM

## 2023-05-30 DIAGNOSIS — K80.20 GALLSTONES: Primary | ICD-10-CM

## 2023-05-30 PROBLEM — E53.8 VITAMIN B12 DEFICIENCY: Status: ACTIVE | Noted: 2023-05-30

## 2023-05-30 PROBLEM — F32.A DEPRESSION: Status: ACTIVE | Noted: 2023-05-30

## 2023-05-30 PROBLEM — E03.9 ACQUIRED HYPOTHYROIDISM: Status: ACTIVE | Noted: 2023-05-30

## 2023-05-30 PROBLEM — K59.09 CHRONIC CONSTIPATION: Status: ACTIVE | Noted: 2023-05-30

## 2023-05-30 PROBLEM — E55.9 VITAMIN D DEFICIENCY: Status: ACTIVE | Noted: 2023-05-30

## 2023-05-30 RX ORDER — FLUOXETINE HYDROCHLORIDE 40 MG/1
CAPSULE ORAL
COMMUNITY
Start: 2023-05-03

## 2023-05-30 NOTE — PATIENT INSTRUCTIONS
Since you are moving to [place] in 2 weeks and want to wait, that's reasonable.  Try avoiding fatty foods and go ahead and find a doc there.   If they are on [facility] EMR, your imaging should be available once [facility] system is back up.

## 2023-05-30 NOTE — PROGRESS NOTES
"Subjective     Lexii Ornelas is a 47 y.o. female who presents with   Chief Complaint   Patient presents with   • Hospital Follow Up Visit     [facility]        History of Present Illness     Was in the ER in early May with left sided chest pain and had an evaluation and they said they saw some gallstones. She's not had an issues since then.  Last time was 3-4 years before that.  She is moving to [place] in 2 weeks and wants to delay.     Seeing an NP for Depression and doing well now on Prozac generic.              Review of Systems     Objective     /76 (BP Location: Left arm, Patient Position: Sitting, Cuff Size: Adult)   Pulse 78   Temp 98.3 °F (36.8 °C) (Oral)   Resp 16   Ht 154.9 cm (61\")   Wt 91.7 kg (202 lb 1.6 oz)   LMP 05/16/2023 (Approximate)   SpO2 99%   Breastfeeding No   BMI 38.19 kg/m²     Physical Exam  Constitutional:       Appearance: Normal appearance.   Cardiovascular:      Rate and Rhythm: Normal rate and regular rhythm.      Heart sounds: Normal heart sounds.   Pulmonary:      Effort: Pulmonary effort is normal.      Breath sounds: Normal breath sounds.   Abdominal:      Tenderness: There is no abdominal tenderness.   Neurological:      Mental Status: She is alert.   Psychiatric:         Behavior: Behavior normal.         Procedures     Assessment & Plan   Diagnoses and all orders for this visit:    1. Gallstones (Primary)  Assessment & Plan:  Deferring intervention until moved.       2. Major depressive disorder in remission, unspecified whether recurrent  Assessment & Plan:  Under management with a psych NP with fluoxetine.         Discussion    Patient Instructions   Since you are moving to [place] in 2 weeks and want to wait, that's reasonable.  Try avoiding fatty foods and go ahead and find a doc there.   If they are on [facility] EMR, your imaging should be available once [facility] system is back up.                 Gila Dillon MD           "

## 2024-02-28 ENCOUNTER — OV NP (OUTPATIENT)
Dept: URBAN - METROPOLITAN AREA CLINIC 12 | Facility: CLINIC | Age: 49
End: 2024-02-28
Payer: COMMERCIAL

## 2024-02-28 ENCOUNTER — LAB (OUTPATIENT)
Dept: URBAN - METROPOLITAN AREA CLINIC 12 | Facility: CLINIC | Age: 49
End: 2024-02-28

## 2024-02-28 VITALS
SYSTOLIC BLOOD PRESSURE: 103 MMHG | BODY MASS INDEX: 37.57 KG/M2 | WEIGHT: 199 LBS | DIASTOLIC BLOOD PRESSURE: 66 MMHG | TEMPERATURE: 97.3 F | HEART RATE: 79 BPM | HEIGHT: 61 IN

## 2024-02-28 DIAGNOSIS — Z12.11 SCREEN FOR COLON CANCER: ICD-10-CM

## 2024-02-28 DIAGNOSIS — K59.09 CHRONIC CONSTIPATION: ICD-10-CM

## 2024-02-28 DIAGNOSIS — K64.8 OTHER HEMORRHOIDS: ICD-10-CM

## 2024-02-28 PROCEDURE — 99243 OFF/OP CNSLTJ NEW/EST LOW 30: CPT | Performed by: INTERNAL MEDICINE

## 2024-02-28 PROCEDURE — 99203 OFFICE O/P NEW LOW 30 MIN: CPT | Performed by: INTERNAL MEDICINE

## 2024-02-28 NOTE — HPI-TODAY'S VISIT:
47 yo  female presenting for evalution for colon cancer screening referred by Dr. Kristy De La Vega for CRC screening. A copy of this note will be sent to the referring physician The patient presents with a history of constipation and external hemorrhoids, which have been present since the birth of her second child. She reports no family history of colon polyps or colon cancer. The patient has never had a colonoscopy before and was referred by her primary care physician, Dr. De La Vega.The patient experiences constipation, having bowel movements approximately four times a week with the aid of stool softeners taken a couple of times a week. She reports drinking over 100 ounces of water daily and has recently started taking fiber supplements as recommended by her primary care physician. The patient describes her stools as compact, hard, and large, requiring straining to pass. The patient has a history of gallbladder issues, with her gallbladder being removed on January 31st. She reports no major changes in bowel movements, bleeding, pain, or weight loss since the surgery. The patient denies any history of heart or lung disease and does not see a cardiologist. She has no other abdominal surgeries and is not taking any blood thinner medications. The patient has not experienced any trouble with anesthesia in the past.

## 2024-04-19 ENCOUNTER — COLON (OUTPATIENT)
Dept: URBAN - METROPOLITAN AREA LAB 3 | Facility: LAB | Age: 49
End: 2024-04-19

## 2024-08-30 ENCOUNTER — OFFICE VISIT (OUTPATIENT)
Dept: URBAN - METROPOLITAN AREA LAB 3 | Facility: LAB | Age: 49
End: 2024-08-30

## 2024-10-04 ENCOUNTER — OFFICE VISIT (OUTPATIENT)
Dept: URBAN - METROPOLITAN AREA LAB 3 | Facility: LAB | Age: 49
End: 2024-10-04
Payer: COMMERCIAL

## 2024-10-04 DIAGNOSIS — D12.0 ADENOMA OF CECUM: ICD-10-CM

## 2024-10-04 DIAGNOSIS — K63.5 BENIGN COLON POLYP: ICD-10-CM

## 2024-10-04 DIAGNOSIS — K62.1 ANAL AND RECTAL POLYP: ICD-10-CM

## 2024-10-04 DIAGNOSIS — Z12.11 COLON CANCER SCREENING: ICD-10-CM

## 2024-10-04 DIAGNOSIS — D12.5 ADENOMA OF SIGMOID COLON: ICD-10-CM

## 2024-10-04 PROCEDURE — 45385 COLONOSCOPY W/LESION REMOVAL: CPT | Performed by: INTERNAL MEDICINE

## 2024-10-23 ENCOUNTER — WEB ENCOUNTER (OUTPATIENT)
Dept: URBAN - METROPOLITAN AREA CLINIC 12 | Facility: CLINIC | Age: 49
End: 2024-10-23

## 2024-12-09 ENCOUNTER — APPOINTMENT (OUTPATIENT)
Dept: URBAN - METROPOLITAN AREA OTHER 8 | Facility: OTHER | Age: 49
Setting detail: DERMATOLOGY
End: 2024-12-09

## 2024-12-09 DIAGNOSIS — L71.8 OTHER ROSACEA: ICD-10-CM

## 2024-12-09 DIAGNOSIS — D22 MELANOCYTIC NEVI: ICD-10-CM

## 2024-12-09 DIAGNOSIS — Z12.83 ENCOUNTER FOR SCREENING FOR MALIGNANT NEOPLASM OF SKIN: ICD-10-CM

## 2024-12-09 DIAGNOSIS — L82.1 OTHER SEBORRHEIC KERATOSIS: ICD-10-CM

## 2024-12-09 DIAGNOSIS — L57.8 OTHER SKIN CHANGES DUE TO CHRONIC EXPOSURE TO NONIONIZING RADIATION: ICD-10-CM | Status: STABLE

## 2024-12-09 DIAGNOSIS — D485 NEOPLASM OF UNCERTAIN BEHAVIOR OF SKIN: ICD-10-CM

## 2024-12-09 DIAGNOSIS — L81.4 OTHER MELANIN HYPERPIGMENTATION: ICD-10-CM

## 2024-12-09 DIAGNOSIS — D18.0 HEMANGIOMA: ICD-10-CM

## 2024-12-09 DIAGNOSIS — Z85.828 PERSONAL HISTORY OF OTHER MALIGNANT NEOPLASM OF SKIN: ICD-10-CM

## 2024-12-09 PROBLEM — D22.62 MELANOCYTIC NEVI OF LEFT UPPER LIMB, INCLUDING SHOULDER: Status: ACTIVE | Noted: 2024-12-09

## 2024-12-09 PROBLEM — D22.71 MELANOCYTIC NEVI OF RIGHT LOWER LIMB, INCLUDING HIP: Status: ACTIVE | Noted: 2024-12-09

## 2024-12-09 PROBLEM — D48.5 NEOPLASM OF UNCERTAIN BEHAVIOR OF SKIN: Status: ACTIVE | Noted: 2024-12-09

## 2024-12-09 PROBLEM — D22.5 MELANOCYTIC NEVI OF TRUNK: Status: ACTIVE | Noted: 2024-12-09

## 2024-12-09 PROBLEM — D22.72 MELANOCYTIC NEVI OF LEFT LOWER LIMB, INCLUDING HIP: Status: ACTIVE | Noted: 2024-12-09

## 2024-12-09 PROBLEM — D23.62 OTHER BENIGN NEOPLASM OF SKIN OF LEFT UPPER LIMB, INCLUDING SHOULDER: Status: ACTIVE | Noted: 2024-12-09

## 2024-12-09 PROBLEM — D18.01 HEMANGIOMA OF SKIN AND SUBCUTANEOUS TISSUE: Status: ACTIVE | Noted: 2024-12-09

## 2024-12-09 PROCEDURE — ? BIOPSY BY SHAVE METHOD

## 2024-12-09 PROCEDURE — ? PRESCRIPTION MEDICATION MANAGEMENT

## 2024-12-09 PROCEDURE — 99204 OFFICE O/P NEW MOD 45 MIN: CPT | Mod: 25

## 2024-12-09 PROCEDURE — ? PRESCRIPTION

## 2024-12-09 PROCEDURE — ? COUNSELING

## 2024-12-09 PROCEDURE — ? RECOMMENDATIONS

## 2024-12-09 PROCEDURE — 11102 TANGNTL BX SKIN SINGLE LES: CPT

## 2024-12-09 RX ORDER — OXYMETAZOLINE HYDROCHLORIDE 30 G/1
CREAM TOPICAL
Qty: 30 | Refills: 2 | Status: ERX | COMMUNITY
Start: 2024-12-09

## 2024-12-09 RX ADMIN — OXYMETAZOLINE HYDROCHLORIDE: 30 CREAM TOPICAL at 00:00

## 2024-12-09 ASSESSMENT — LOCATION SIMPLE DESCRIPTION DERM
LOCATION SIMPLE: SUPERIOR FOREHEAD
LOCATION SIMPLE: LEFT POSTERIOR THIGH
LOCATION SIMPLE: LEFT UPPER ARM
LOCATION SIMPLE: CHEST
LOCATION SIMPLE: LEFT LOWER BACK
LOCATION SIMPLE: RIGHT CHEEK
LOCATION SIMPLE: LEFT THIGH
LOCATION SIMPLE: RIGHT THIGH
LOCATION SIMPLE: LEFT SHOULDER
LOCATION SIMPLE: RIGHT PRETIBIAL REGION
LOCATION SIMPLE: LEFT CHEEK
LOCATION SIMPLE: RIGHT FOREARM
LOCATION SIMPLE: ABDOMEN
LOCATION SIMPLE: LEFT UPPER BACK
LOCATION SIMPLE: LEFT FOREARM

## 2024-12-09 ASSESSMENT — LOCATION DETAILED DESCRIPTION DERM
LOCATION DETAILED: LEFT ANTERIOR DISTAL THIGH
LOCATION DETAILED: LEFT DISTAL POSTERIOR THIGH
LOCATION DETAILED: RIGHT PROXIMAL DORSAL FOREARM
LOCATION DETAILED: LEFT ANTERIOR SHOULDER
LOCATION DETAILED: LEFT PROXIMAL DORSAL FOREARM
LOCATION DETAILED: LEFT SUPERIOR NASAL CHEEK
LOCATION DETAILED: LEFT MEDIAL UPPER BACK
LOCATION DETAILED: STERNAL NOTCH
LOCATION DETAILED: RIGHT INFERIOR MEDIAL MALAR CHEEK
LOCATION DETAILED: LEFT SUPERIOR MEDIAL MIDBACK
LOCATION DETAILED: LEFT INFERIOR MEDIAL MALAR CHEEK
LOCATION DETAILED: RIGHT ANTERIOR PROXIMAL THIGH
LOCATION DETAILED: EPIGASTRIC SKIN
LOCATION DETAILED: LEFT INFERIOR UPPER BACK
LOCATION DETAILED: RIGHT VENTRAL PROXIMAL FOREARM
LOCATION DETAILED: SUPERIOR MID FOREHEAD
LOCATION DETAILED: LEFT ANTERIOR PROXIMAL UPPER ARM
LOCATION DETAILED: RIGHT DISTAL PRETIBIAL REGION
LOCATION DETAILED: LEFT SUPERIOR UPPER BACK
LOCATION DETAILED: LEFT LATERAL SUPERIOR CHEST
LOCATION DETAILED: RIGHT MEDIAL SUPERIOR CHEST

## 2024-12-09 ASSESSMENT — PAIN INTENSITY VAS: HOW INTENSE IS YOUR PAIN 0 BEING NO PAIN, 10 BEING THE MOST SEVERE PAIN POSSIBLE?: NO PAIN

## 2024-12-09 ASSESSMENT — LOCATION ZONE DERM
LOCATION ZONE: FACE
LOCATION ZONE: TRUNK
LOCATION ZONE: ARM
LOCATION ZONE: LEG

## 2024-12-09 NOTE — PROCEDURE: RECOMMENDATIONS
Render Risk Assessment In Note?: no
Recommendations (Free Text): Kerwin Mcdonough for IPL
Recommendation Preamble: The following was recommended durring the visit:
Detail Level: Zone

## 2024-12-09 NOTE — PROCEDURE: BIOPSY BY SHAVE METHOD
Detail Level: Detailed
Depth Of Biopsy: dermis
Was A Bandage Applied: Yes
Size Of Lesion In Cm: 0
Biopsy Type: H and E
Biopsy Method: Personna blade
Anesthesia Type: 1% lidocaine with epinephrine
Anesthesia Volume In Cc: 1
Hemostasis: Aluminum Chloride
Wound Care: Vaseline
Dressing: Band-Aid
Destruction After The Procedure: No
Type Of Destruction Used: Curettage
Curettage Text: The wound bed was treated with curettage after the biopsy was performed.
Cryotherapy Text: The wound bed was treated with cryotherapy after the biopsy was performed.
Electrodesiccation Text: The wound bed was treated with electrodesiccation after the biopsy was performed.
Electrodesiccation And Curettage Text: The wound bed was treated with electrodesiccation and curettage after the biopsy was performed.
Silver Nitrate Text: The wound bed was treated with silver nitrate after the biopsy was performed.
Lab: [id number]
Lab Facility: [id number]
Medical Necessity Information: It is in your best interest to select a reason for this procedure from the list below. All of these items fulfill various CMS LCD requirements except the new and changing color options.
Consent: Written consent was obtained and risks were reviewed including but not limited to scarring, infection, bleeding, scabbing, incomplete removal, nerve damage and allergy to anesthesia.
Post-Care Instructions: I reviewed with the patient in detail post-care instructions. Patient is to keep the biopsy site dry overnight, and then apply bacitracin twice daily until healed. Patient may apply hydrogen peroxide soaks to remove any crusting.
Notification Instructions: Patient will be notified of biopsy results. However, patient instructed to call the office if not contacted within 2 weeks.
Billing Type: Third-Party Bill
Information: Selecting Yes will display possible errors in your note based on the variables you have selected. This validation is only offered as a suggestion for you. PLEASE NOTE THAT THE VALIDATION TEXT WILL BE REMOVED WHEN YOU FINALIZE YOUR NOTE. IF YOU WANT TO FAX A PRELIMINARY NOTE YOU WILL NEED TO TOGGLE THIS TO 'NO' IF YOU DO NOT WANT IT IN YOUR FAXED NOTE.

## 2024-12-09 NOTE — PROCEDURE: COUNSELING
Detail Level: Generalized
Sunscreen Recommendations: LEWIS sunscreen
Detail Level: Zone
Sunscreen Recommendations: LEWIS sunscreen SPF 30+
Sunscreen Recommendations: Recommended fragrance free or Mineral Based Sunscreens
Sunscreen Recommendations: Eryfotona Actinica emulsion Spf 50
Detail Level: Simple
Detail Level: Detailed

## 2025-03-11 ENCOUNTER — APPOINTMENT (OUTPATIENT)
Dept: URBAN - METROPOLITAN AREA OTHER 8 | Facility: OTHER | Age: 50
Setting detail: DERMATOLOGY
End: 2025-03-11

## 2025-03-11 PROBLEM — C44.619 BASAL CELL CARCINOMA OF SKIN OF LEFT UPPER LIMB, INCLUDING SHOULDER: Status: ACTIVE | Noted: 2025-03-11

## 2025-03-11 PROCEDURE — 13121 CMPLX RPR S/A/L 2.6-7.5 CM: CPT

## 2025-03-11 PROCEDURE — 11603 EXC TR-EXT MAL+MARG 2.1-3 CM: CPT

## 2025-03-11 PROCEDURE — ? PRESCRIPTION

## 2025-03-11 PROCEDURE — ? EXCISION

## 2025-03-11 RX ORDER — MUPIROCIN 20 MG/G
OINTMENT TOPICAL
Qty: 22 | Refills: 0 | Status: ERX | COMMUNITY
Start: 2025-03-11

## 2025-03-11 RX ADMIN — MUPIROCIN: 20 OINTMENT TOPICAL at 00:00

## 2025-03-11 NOTE — PROCEDURE: EXCISION

## 2025-03-24 ENCOUNTER — APPOINTMENT (OUTPATIENT)
Dept: URBAN - METROPOLITAN AREA OTHER 8 | Facility: OTHER | Age: 50
Setting detail: DERMATOLOGY
End: 2025-03-24

## 2025-03-24 DIAGNOSIS — Z48.02 ENCOUNTER FOR REMOVAL OF SUTURES: ICD-10-CM

## 2025-03-24 DIAGNOSIS — L71.8 OTHER ROSACEA: ICD-10-CM

## 2025-03-24 PROCEDURE — ? COUNSELING

## 2025-03-24 PROCEDURE — 99024 POSTOP FOLLOW-UP VISIT: CPT

## 2025-03-24 PROCEDURE — ? PRESCRIPTION MEDICATION MANAGEMENT

## 2025-03-24 PROCEDURE — ? SUTURE REMOVAL (GLOBAL PERIOD)

## 2025-03-24 ASSESSMENT — LOCATION SIMPLE DESCRIPTION DERM
LOCATION SIMPLE: RIGHT CHEEK
LOCATION SIMPLE: LEFT CHEEK
LOCATION SIMPLE: LEFT UPPER ARM

## 2025-03-24 ASSESSMENT — LOCATION DETAILED DESCRIPTION DERM
LOCATION DETAILED: RIGHT INFERIOR MEDIAL MALAR CHEEK
LOCATION DETAILED: LEFT ANTERIOR PROXIMAL UPPER ARM
LOCATION DETAILED: LEFT INFERIOR MEDIAL MALAR CHEEK

## 2025-03-24 ASSESSMENT — LOCATION ZONE DERM
LOCATION ZONE: ARM
LOCATION ZONE: FACE

## 2025-03-24 NOTE — PROCEDURE: SUTURE REMOVAL (GLOBAL PERIOD)
Detail Level: Detailed
Add 17417 Cpt? (Important Note: In 2017 The Use Of 20863 Is Being Tracked By Cms To Determine Future Global Period Reimbursement For Global Periods): yes

## 2025-03-24 NOTE — PROCEDURE: PRESCRIPTION MEDICATION MANAGEMENT
Continue Regimen: Rhofade 1 % topical cream ( [facility] ) \\nApply to affected areas of face once daily every morning.
Detail Level: Zone
Render In Strict Bullet Format?: Yes